# Patient Record
Sex: FEMALE | Race: WHITE | URBAN - METROPOLITAN AREA
[De-identification: names, ages, dates, MRNs, and addresses within clinical notes are randomized per-mention and may not be internally consistent; named-entity substitution may affect disease eponyms.]

---

## 2017-02-03 ENCOUNTER — OFFICE VISIT (OUTPATIENT)
Dept: INTERNAL MEDICINE CLINIC | Facility: CLINIC | Age: 32
End: 2017-02-03

## 2017-02-03 VITALS
HEIGHT: 63 IN | HEART RATE: 65 BPM | BODY MASS INDEX: 34.87 KG/M2 | WEIGHT: 196.81 LBS | SYSTOLIC BLOOD PRESSURE: 128 MMHG | DIASTOLIC BLOOD PRESSURE: 86 MMHG | RESPIRATION RATE: 16 BRPM

## 2017-02-03 DIAGNOSIS — R53.81 MALAISE AND FATIGUE: Primary | ICD-10-CM

## 2017-02-03 DIAGNOSIS — R53.83 MALAISE AND FATIGUE: Primary | ICD-10-CM

## 2017-02-03 DIAGNOSIS — E66.01 MORBID OBESITY, UNSPECIFIED OBESITY TYPE (HCC): ICD-10-CM

## 2017-02-03 PROCEDURE — 99213 OFFICE O/P EST LOW 20 MIN: CPT | Performed by: INTERNAL MEDICINE

## 2017-02-03 PROCEDURE — 99212 OFFICE O/P EST SF 10 MIN: CPT | Performed by: INTERNAL MEDICINE

## 2017-02-03 RX ORDER — TOPIRAMATE 50 MG/1
50 TABLET, FILM COATED ORAL DAILY
Qty: 30 TABLET | Refills: 3 | Status: SHIPPED | OUTPATIENT
Start: 2017-02-03 | End: 2017-06-02 | Stop reason: DRUGHIGH

## 2017-02-03 RX ORDER — PHENTERMINE HYDROCHLORIDE 37.5 MG/1
37.5 TABLET ORAL
Qty: 30 TABLET | Refills: 2 | Status: SHIPPED | OUTPATIENT
Start: 2017-02-03 | End: 2017-06-02

## 2017-02-03 NOTE — PROGRESS NOTES
Former pt  Chronically fatigued  Had lost 27 pounds on phentermine and topamax has been off for a year wants to restart them  History reviewed. No pertinent past medical history.       Past Surgical History    CHOLECYSTECTOMY            Comment samara

## 2017-06-02 ENCOUNTER — OFFICE VISIT (OUTPATIENT)
Dept: INTERNAL MEDICINE CLINIC | Facility: CLINIC | Age: 32
End: 2017-06-02

## 2017-06-02 VITALS
HEIGHT: 63 IN | RESPIRATION RATE: 29 BRPM | DIASTOLIC BLOOD PRESSURE: 87 MMHG | HEART RATE: 68 BPM | SYSTOLIC BLOOD PRESSURE: 132 MMHG | WEIGHT: 182 LBS | BODY MASS INDEX: 32.25 KG/M2 | TEMPERATURE: 98 F

## 2017-06-02 DIAGNOSIS — R53.81 MALAISE AND FATIGUE: Primary | ICD-10-CM

## 2017-06-02 DIAGNOSIS — R53.83 MALAISE AND FATIGUE: Primary | ICD-10-CM

## 2017-06-02 PROCEDURE — 99212 OFFICE O/P EST SF 10 MIN: CPT | Performed by: INTERNAL MEDICINE

## 2017-06-02 PROCEDURE — 99213 OFFICE O/P EST LOW 20 MIN: CPT | Performed by: INTERNAL MEDICINE

## 2017-06-02 RX ORDER — TOPIRAMATE 25 MG/1
25 TABLET ORAL DAILY
Qty: 90 TABLET | Refills: 6 | Status: SHIPPED | OUTPATIENT
Start: 2017-06-02 | End: 2017-06-02 | Stop reason: CLARIF

## 2017-06-02 RX ORDER — PHENTERMINE HYDROCHLORIDE 37.5 MG/1
37.5 TABLET ORAL
Qty: 30 TABLET | Refills: 4 | Status: SHIPPED | OUTPATIENT
Start: 2017-06-02

## 2017-06-02 RX ORDER — TOPIRAMATE 25 MG/1
TABLET ORAL
Qty: 100 TABLET | Refills: 3 | Status: SHIPPED | OUTPATIENT
Start: 2017-06-02

## 2017-06-02 NOTE — PROGRESS NOTES
Patient ID: Laney Cheung is a 28year old year old female. VITALS:   06/02/17  1218   BP: 132/87   Pulse: 68   Temp: 98.2 °F (36.8 °C)   Resp: 29     Body mass index is 32.25 kg/(m^2).   Wt Readings from Last 6 Encounters:  06/02/17 : 182 lb (82.555 kg)  0 known as:  TOPAMAX   3 pills a day   What changed:    - medication strength  - how much to take  - how to take this  - when to take this  - additional instructions   Changed by:  Krishan Del Angel MD         CONTINUE taking these medications          Phen

## 2017-12-13 NOTE — TELEPHONE ENCOUNTER
Please call pt and assist w/ scheduling OV w/ new provider as hers no longer here    Please reply to pool: AISHA Crews

## 2017-12-18 RX ORDER — PHENTERMINE HYDROCHLORIDE 37.5 MG/1
TABLET ORAL
Qty: 30 TABLET | OUTPATIENT
Start: 2017-12-18

## (undated) NOTE — MR AVS SNAPSHOT
[facility] - [place] [address] [id number]  [phone]               Thank you for choosing us for your health care visit with Divya Robertson MD.  We are glad to serve you and happy to provide you with this summary o If you've recently had a stay at the Hospital you can access your discharge instructions in [facility] by going to Visits < Admission Summaries.  If you've been to the Emergency Department or your doctor's office, you can view your past visit information in My

## (undated) NOTE — MR AVS SNAPSHOT
[facility] - [place] [address] [place]  [id number]  [phone]               Thank you for choosing us for your health care visit with Ximena Feliciano MD.  We are glad to serve you and happy to provide you with this summary o Fully enjoy your food when eating. Don’t eat while distracted and slow down. Avoid over sized portions. Don’t eat while when you’re bored.      EAT THESE FOODS MORE OFTEN: EAT THESE FOODS LESS OFTEN:   Make half your plate fruits and vegetables Highly